# Patient Record
Sex: FEMALE | URBAN - METROPOLITAN AREA
[De-identification: names, ages, dates, MRNs, and addresses within clinical notes are randomized per-mention and may not be internally consistent; named-entity substitution may affect disease eponyms.]

---

## 2017-11-18 ENCOUNTER — HOSPITAL ENCOUNTER (EMERGENCY)
Facility: HOSPITAL | Age: 82
Discharge: STILL ADMITTED OR ADMINISTRATIVE | End: 2017-11-18
Attending: EMERGENCY MEDICINE

## 2017-11-19 NOTE — ED INITIAL ASSESSMENT (HPI)
Patient brought via paramedics for syncopal episode lasting approximately 30-45 seconds, states patient did not fall but was caught and helped to the ground, patient a&ox3 at this time, patient states she has intermittent dizziness, denies chest pain/hayder